# Patient Record
Sex: MALE | Race: WHITE | NOT HISPANIC OR LATINO
[De-identification: names, ages, dates, MRNs, and addresses within clinical notes are randomized per-mention and may not be internally consistent; named-entity substitution may affect disease eponyms.]

---

## 2017-01-17 ENCOUNTER — APPOINTMENT (OUTPATIENT)
Dept: COLORECTAL SURGERY | Facility: CLINIC | Age: 72
End: 2017-01-17

## 2017-01-17 VITALS
RESPIRATION RATE: 14 BRPM | SYSTOLIC BLOOD PRESSURE: 134 MMHG | HEIGHT: 71 IN | TEMPERATURE: 97.9 F | WEIGHT: 170 LBS | BODY MASS INDEX: 23.8 KG/M2 | HEART RATE: 81 BPM | DIASTOLIC BLOOD PRESSURE: 98 MMHG

## 2017-01-17 DIAGNOSIS — Z87.898 PERSONAL HISTORY OF OTHER SPECIFIED CONDITIONS: ICD-10-CM

## 2017-01-17 DIAGNOSIS — Z86.19 PERSONAL HISTORY OF OTHER INFECTIOUS AND PARASITIC DISEASES: ICD-10-CM

## 2017-01-17 DIAGNOSIS — F15.90 OTHER STIMULANT USE, UNSPECIFIED, UNCOMPLICATED: ICD-10-CM

## 2017-01-17 DIAGNOSIS — K62.89 OTHER SPECIFIED DISEASES OF ANUS AND RECTUM: ICD-10-CM

## 2017-01-17 DIAGNOSIS — Z86.39 PERSONAL HISTORY OF OTHER ENDOCRINE, NUTRITIONAL AND METABOLIC DISEASE: ICD-10-CM

## 2017-01-17 DIAGNOSIS — Z87.891 PERSONAL HISTORY OF NICOTINE DEPENDENCE: ICD-10-CM

## 2017-01-17 DIAGNOSIS — K62.5 HEMORRHAGE OF ANUS AND RECTUM: ICD-10-CM

## 2017-01-17 RX ORDER — FLUOCINOLONE ACETONIDE 0.25 MG/G
0.03 CREAM TOPICAL TWICE DAILY
Qty: 30 | Refills: 3 | Status: ACTIVE | COMMUNITY
Start: 2017-01-17 | End: 1900-01-01

## 2017-02-28 ENCOUNTER — APPOINTMENT (OUTPATIENT)
Dept: COLORECTAL SURGERY | Facility: CLINIC | Age: 72
End: 2017-02-28

## 2017-02-28 VITALS
RESPIRATION RATE: 14 BRPM | HEIGHT: 71 IN | HEART RATE: 78 BPM | BODY MASS INDEX: 23.8 KG/M2 | TEMPERATURE: 97.8 F | DIASTOLIC BLOOD PRESSURE: 88 MMHG | SYSTOLIC BLOOD PRESSURE: 130 MMHG | WEIGHT: 170 LBS

## 2017-02-28 DIAGNOSIS — K64.8 RESIDUAL HEMORRHOIDAL SKIN TAGS: ICD-10-CM

## 2017-02-28 DIAGNOSIS — Z87.19 PERSONAL HISTORY OF OTHER DISEASES OF THE DIGESTIVE SYSTEM: ICD-10-CM

## 2017-02-28 DIAGNOSIS — K60.2 ANAL FISSURE, UNSPECIFIED: ICD-10-CM

## 2017-02-28 DIAGNOSIS — Z78.9 OTHER SPECIFIED HEALTH STATUS: ICD-10-CM

## 2017-02-28 DIAGNOSIS — Z86.010 PERSONAL HISTORY OF COLONIC POLYPS: ICD-10-CM

## 2017-02-28 DIAGNOSIS — K64.4 RESIDUAL HEMORRHOIDAL SKIN TAGS: ICD-10-CM

## 2017-02-28 DIAGNOSIS — K80.20 CALCULUS OF GALLBLADDER W/OUT CHOLECYSTITIS W/OUT OBSTRUCTION: ICD-10-CM

## 2017-03-04 PROBLEM — Z87.19 HISTORY OF ACUTE PANCREATITIS: Status: RESOLVED | Noted: 2017-01-18 | Resolved: 2017-03-04

## 2017-03-04 PROBLEM — K60.2 ANAL FISSURE: Status: ACTIVE | Noted: 2017-01-17

## 2017-03-04 PROBLEM — Z86.010 HISTORY OF COLON POLYPS: Status: RESOLVED | Noted: 2017-01-18 | Resolved: 2017-03-04

## 2017-03-04 PROBLEM — Z78.9 SOCIAL ALCOHOL USE: Status: ACTIVE | Noted: 2017-01-18

## 2017-03-04 PROBLEM — K64.4 INTERNAL AND EXTERNAL BLEEDING HEMORRHOIDS: Status: ACTIVE | Noted: 2017-01-17

## 2017-03-04 PROBLEM — K80.20 GALL STONES: Status: RESOLVED | Noted: 2017-01-18 | Resolved: 2017-03-04

## 2017-03-04 RX ORDER — TOPIRAMATE 25 MG/1
25 TABLET, FILM COATED ORAL
Qty: 120 | Refills: 0 | Status: ACTIVE | COMMUNITY
Start: 2016-12-17

## 2017-03-04 RX ORDER — ATORVASTATIN CALCIUM 10 MG/1
10 TABLET, FILM COATED ORAL
Qty: 90 | Refills: 0 | Status: ACTIVE | COMMUNITY
Start: 2016-09-10

## 2017-03-04 RX ORDER — TAMSULOSIN HYDROCHLORIDE 0.4 MG/1
0.4 CAPSULE ORAL
Qty: 180 | Refills: 0 | Status: ACTIVE | COMMUNITY
Start: 2016-11-12

## 2017-03-04 RX ORDER — FLUOCINOLONE ACETONIDE 0.1 MG/ML
0.01 SOLUTION TOPICAL
Qty: 60 | Refills: 0 | Status: ACTIVE | COMMUNITY
Start: 2017-02-01

## 2017-03-04 RX ORDER — ATORVASTATIN CALCIUM 80 MG/1
TABLET, FILM COATED ORAL
Refills: 0 | Status: ACTIVE | COMMUNITY

## 2017-03-04 RX ORDER — MULTIVITAMIN
TABLET ORAL
Refills: 0 | Status: ACTIVE | COMMUNITY

## 2017-03-04 RX ORDER — FLUOCINONIDE 0.05 MG/G
0.05 OINTMENT TOPICAL
Qty: 30 | Refills: 0 | Status: ACTIVE | COMMUNITY
Start: 2017-02-01

## 2017-03-04 RX ORDER — FINASTERIDE 5 MG/1
5 TABLET, FILM COATED ORAL
Refills: 0 | Status: ACTIVE | COMMUNITY

## 2017-03-04 RX ORDER — VERAPAMIL HYDROCHLORIDE 80 MG/1
80 TABLET ORAL
Qty: 90 | Refills: 0 | Status: ACTIVE | COMMUNITY
Start: 2017-01-14

## 2017-03-04 RX ORDER — BETAMETHASONE VALERATE 1 MG/G
0.1 CREAM TOPICAL
Qty: 15 | Refills: 0 | Status: ACTIVE | COMMUNITY
Start: 2016-08-31

## 2017-03-04 RX ORDER — UBIDECARENONE/VIT E ACET 100MG-5
CAPSULE ORAL
Refills: 0 | Status: ACTIVE | COMMUNITY

## 2017-03-04 RX ORDER — ASPIRIN 325 MG/1
TABLET, FILM COATED ORAL
Refills: 0 | Status: ACTIVE | COMMUNITY

## 2018-02-02 ENCOUNTER — RESULT REVIEW (OUTPATIENT)
Age: 73
End: 2018-02-02

## 2018-11-09 ENCOUNTER — RESULT REVIEW (OUTPATIENT)
Age: 73
End: 2018-11-09

## 2019-09-16 ENCOUNTER — RX RENEWAL (OUTPATIENT)
Age: 74
End: 2019-09-16

## 2020-07-14 ENCOUNTER — APPOINTMENT (OUTPATIENT)
Dept: COLORECTAL SURGERY | Facility: CLINIC | Age: 75
End: 2020-07-14
Payer: MEDICARE

## 2020-07-14 VITALS
BODY MASS INDEX: 24.36 KG/M2 | DIASTOLIC BLOOD PRESSURE: 78 MMHG | HEIGHT: 71 IN | HEART RATE: 74 BPM | WEIGHT: 174 LBS | RESPIRATION RATE: 14 BRPM | TEMPERATURE: 97.6 F | SYSTOLIC BLOOD PRESSURE: 124 MMHG

## 2020-07-14 DIAGNOSIS — M54.30 SCIATICA, UNSPECIFIED SIDE: ICD-10-CM

## 2020-07-14 DIAGNOSIS — Z87.19 PERSONAL HISTORY OF OTHER DISEASES OF THE DIGESTIVE SYSTEM: ICD-10-CM

## 2020-07-14 PROCEDURE — 46600 DIAGNOSTIC ANOSCOPY SPX: CPT

## 2020-07-14 PROCEDURE — 99203 OFFICE O/P NEW LOW 30 MIN: CPT

## 2020-07-14 NOTE — HISTORY OF PRESENT ILLNESS
[FreeTextEntry1] : 74-year-old male who presents with a 6-week history of itching.  He does have a history of hemorrhoids, anal fissure and itching which are managed nonoperatively.  He rarely has blood with wiping.  He denies any changes in his bowel movements.  His colonoscopy is up-to-date.  No abdominal pain, nausea or vomiting.  He has a remote history of hemorrhoidectomy.

## 2020-07-14 NOTE — PHYSICAL EXAM
[Lax] : was lax [None] : there was no rectal mass  [Respiratory Effort] : normal respiratory effort [Normal Rate and Rhythm] : normal rate and rhythm [Calm] : calm [Excoriation] : no perianal excoriation [Gross Blood] : no gross blood [de-identified] : Soft, nontender, nondistended.  No mass or hernia appreciated.  Right midline paramedian incision. [de-identified] : Grade 1 internal hemorrhoids [de-identified] : Well-appearing, in no distress [de-identified] : Normocephalic, atraumatic [de-identified] : Moves extremities without difficulty [de-identified] : Warm and dry [de-identified] : Alert and oriented x3

## 2021-08-12 ENCOUNTER — APPOINTMENT (OUTPATIENT)
Dept: COLORECTAL SURGERY | Facility: CLINIC | Age: 76
End: 2021-08-12
Payer: MEDICARE

## 2021-08-12 VITALS
TEMPERATURE: 97.2 F | HEIGHT: 71 IN | SYSTOLIC BLOOD PRESSURE: 142 MMHG | DIASTOLIC BLOOD PRESSURE: 77 MMHG | BODY MASS INDEX: 25.62 KG/M2 | RESPIRATION RATE: 14 BRPM | OXYGEN SATURATION: 96 % | HEART RATE: 80 BPM | WEIGHT: 183 LBS

## 2021-08-12 DIAGNOSIS — L29.0 PRURITUS ANI: ICD-10-CM

## 2021-08-12 PROCEDURE — 46600 DIAGNOSTIC ANOSCOPY SPX: CPT

## 2021-08-12 PROCEDURE — 99213 OFFICE O/P EST LOW 20 MIN: CPT | Mod: 25

## 2021-08-12 RX ORDER — HYDROCORTISONE ACETATE 25 MG/1
25 SUPPOSITORY RECTAL
Qty: 7 | Refills: 1 | Status: ACTIVE | COMMUNITY
Start: 2020-07-14 | End: 1900-01-01

## 2021-08-12 RX ORDER — HYDROCORTISONE ACETATE AND PRAMOXINE HYDROCHLORIDE 25; 10 MG/G; MG/G
2.5-1 CREAM TOPICAL
Qty: 1 | Refills: 0 | Status: ACTIVE | COMMUNITY
Start: 2021-08-12 | End: 1900-01-01

## 2021-08-12 NOTE — HISTORY OF PRESENT ILLNESS
[FreeTextEntry1] : 75-year-old male who presents with a 3 week history of itching.  He does have a history of hemorrhoids, anal fissure and itching which are managed nonoperatively.  He had similar symptoms of anal itching last year which resolved with use of a suppository.  He is not having any abdominal pain or changes in bowel function.  No blood in his stools.  He is due for colonoscopy this year.  He has a remote history of hemorrhoidectomy.

## 2021-08-12 NOTE — PHYSICAL EXAM
[Excoriation] : no perianal excoriation [Lax] : was lax [None] : there was no rectal mass  [Gross Blood] : no gross blood [Respiratory Effort] : normal respiratory effort [Calm] : calm [de-identified] : Grade 1 internal hemorrhoids [de-identified] : Well-appearing, in no distress [de-identified] : Normocephalic, atraumatic [de-identified] : Moves extremities without difficulty [de-identified] : Warm and dry [de-identified] : Alert and oriented x3

## 2021-10-28 ENCOUNTER — APPOINTMENT (OUTPATIENT)
Dept: GASTROENTEROLOGY | Facility: CLINIC | Age: 76
End: 2021-10-28
Payer: MEDICARE

## 2021-10-28 VITALS
BODY MASS INDEX: 25.76 KG/M2 | HEART RATE: 80 BPM | DIASTOLIC BLOOD PRESSURE: 80 MMHG | WEIGHT: 184 LBS | SYSTOLIC BLOOD PRESSURE: 140 MMHG | HEIGHT: 71 IN

## 2021-10-28 DIAGNOSIS — Z86.010 PERSONAL HISTORY OF COLONIC POLYPS: ICD-10-CM

## 2021-10-28 PROCEDURE — 99213 OFFICE O/P EST LOW 20 MIN: CPT

## 2021-10-28 PROCEDURE — 99203 OFFICE O/P NEW LOW 30 MIN: CPT

## 2021-10-28 RX ORDER — SODIUM PICOSULFATE, MAGNESIUM OXIDE, AND ANHYDROUS CITRIC ACID 10; 3.5; 12 MG/160ML; G/160ML; G/160ML
10-3.5-12 MG-GM LIQUID ORAL
Qty: 2 | Refills: 0 | Status: ACTIVE | COMMUNITY
Start: 2021-10-28 | End: 1900-01-01

## 2021-10-28 NOTE — ASSESSMENT
[FreeTextEntry1] : 74yo male with gerd, hx colon polyps\par \par Will check egd r/o peterson's surveillance\par will check colonoscopy with clenpiq\par \par Risks and benefits of procedure(s) discussed with patient in detail, including but not limited to, perforation, bleeding, reaction to anesthesia, missed lesions.\par

## 2022-01-07 ENCOUNTER — APPOINTMENT (OUTPATIENT)
Dept: DISASTER EMERGENCY | Facility: CLINIC | Age: 77
End: 2022-01-07

## 2022-01-11 ENCOUNTER — APPOINTMENT (OUTPATIENT)
Dept: GASTROENTEROLOGY | Facility: AMBULATORY MEDICAL SERVICES | Age: 77
End: 2022-01-11
Payer: MEDICARE

## 2022-01-11 ENCOUNTER — RESULT REVIEW (OUTPATIENT)
Age: 77
End: 2022-01-11

## 2022-01-11 PROCEDURE — 45378 DIAGNOSTIC COLONOSCOPY: CPT

## 2022-01-11 PROCEDURE — 43239 EGD BIOPSY SINGLE/MULTIPLE: CPT

## 2022-02-03 ENCOUNTER — APPOINTMENT (OUTPATIENT)
Dept: GASTROENTEROLOGY | Facility: CLINIC | Age: 77
End: 2022-02-03
Payer: MEDICARE

## 2022-02-03 VITALS
SYSTOLIC BLOOD PRESSURE: 133 MMHG | HEART RATE: 63 BPM | WEIGHT: 186 LBS | HEIGHT: 71 IN | BODY MASS INDEX: 26.04 KG/M2 | DIASTOLIC BLOOD PRESSURE: 70 MMHG

## 2022-02-03 DIAGNOSIS — R68.81 EARLY SATIETY: ICD-10-CM

## 2022-02-03 DIAGNOSIS — K21.9 GASTRO-ESOPHAGEAL REFLUX DISEASE W/OUT ESOPHAGITIS: ICD-10-CM

## 2022-02-03 PROCEDURE — 99212 OFFICE O/P EST SF 10 MIN: CPT

## 2022-02-03 NOTE — HISTORY OF PRESENT ILLNESS
[de-identified] : 75yo male with gerd, early satiety\par \par overall he is feeling well\par He has some early satiety but still eating well without weight loss\par no significant \par Biopsies and egd reviewed in detail\par

## 2022-02-03 NOTE — ASSESSMENT
[FreeTextEntry1] : 75yo male with gerd, early satiety\par \par GERD  - continue PPI\par \par early satiety - we discussed possible further evaluation with gastric emptying study or CT scan\par as he maintaining his weight and still eating well (just not as much as in the past) we agreed to monitor for now\par \par he will call to notify me of any changes

## 2022-11-15 ENCOUNTER — OFFICE (OUTPATIENT)
Dept: URBAN - METROPOLITAN AREA CLINIC 112 | Facility: CLINIC | Age: 77
Setting detail: OPHTHALMOLOGY
End: 2022-11-15
Payer: MEDICARE

## 2022-11-15 DIAGNOSIS — H04.121: ICD-10-CM

## 2022-11-15 DIAGNOSIS — H04.123: ICD-10-CM

## 2022-11-15 DIAGNOSIS — H04.122: ICD-10-CM

## 2022-11-15 DIAGNOSIS — H40.013: ICD-10-CM

## 2022-11-15 DIAGNOSIS — H26.492: ICD-10-CM

## 2022-11-15 PROCEDURE — 92133 CPTRZD OPH DX IMG PST SGM ON: CPT | Performed by: OPHTHALMOLOGY

## 2022-11-15 PROCEDURE — 92083 EXTENDED VISUAL FIELD XM: CPT | Performed by: OPHTHALMOLOGY

## 2022-11-15 PROCEDURE — 83861 MICROFLUID ANALY TEARS: CPT | Performed by: OPHTHALMOLOGY

## 2022-11-15 PROCEDURE — 99214 OFFICE O/P EST MOD 30 MIN: CPT | Performed by: OPHTHALMOLOGY

## 2022-11-15 ASSESSMENT — KERATOMETRY
OD_K1POWER_DIOPTERS: 42.50
METHOD_AUTO_MANUAL: AUTO
OS_K2POWER_DIOPTERS: 43.25
OS_K1POWER_DIOPTERS: 42.50
OD_K2POWER_DIOPTERS: 43.00
OS_AXISANGLE_DEGREES: 127
OD_AXISANGLE_DEGREES: 076

## 2022-11-15 ASSESSMENT — CORNEAL SURGICAL SCARRING
OD_SCARRING: STROMAL
OS_SCARRING: STROMAL

## 2022-11-15 ASSESSMENT — SPHEQUIV_DERIVED
OS_SPHEQUIV: -0.375
OD_SPHEQUIV: 0

## 2022-11-15 ASSESSMENT — CONFRONTATIONAL VISUAL FIELD TEST (CVF)
OS_FINDINGS: FULL
OD_FINDINGS: FULL

## 2022-11-15 ASSESSMENT — REFRACTION_AUTOREFRACTION
OD_SPHERE: +0.25
OS_CYLINDER: -1.25
OS_AXIS: 055
OD_CYLINDER: -0.50
OD_AXIS: 126
OS_SPHERE: +0.25

## 2022-11-15 ASSESSMENT — PACHYMETRY
OS_CT_UM: 588
OD_CT_UM: 579
OS_CT_CORRECTION: -3
OD_CT_CORRECTION: -2

## 2022-11-15 ASSESSMENT — TONOMETRY
OS_IOP_MMHG: 11
OD_IOP_MMHG: 10

## 2022-11-15 ASSESSMENT — AXIALLENGTH_DERIVED
OS_AL: 23.9737
OD_AL: 23.8706

## 2022-11-15 ASSESSMENT — VISUAL ACUITY
OS_BCVA: 20/20-1
OD_BCVA: 20/25

## 2022-11-17 ENCOUNTER — ASC (OUTPATIENT)
Dept: URBAN - METROPOLITAN AREA SURGERY 8 | Facility: SURGERY | Age: 77
Setting detail: OPHTHALMOLOGY
End: 2022-11-17
Payer: MEDICARE

## 2022-11-17 DIAGNOSIS — H26.492: ICD-10-CM

## 2022-11-17 PROCEDURE — 66821 AFTER CATARACT LASER SURGERY: CPT | Performed by: OPHTHALMOLOGY

## 2022-11-17 ASSESSMENT — REFRACTION_AUTOREFRACTION
OS_AXIS: 055
OS_CYLINDER: -1.25
OD_SPHERE: +0.25
OD_AXIS: 126
OD_CYLINDER: -0.50
OS_SPHERE: +0.25

## 2022-11-17 ASSESSMENT — KERATOMETRY
OD_AXISANGLE_DEGREES: 076
OS_K1POWER_DIOPTERS: 42.50
OS_K2POWER_DIOPTERS: 43.25
OS_AXISANGLE_DEGREES: 127
METHOD_AUTO_MANUAL: AUTO
OD_K1POWER_DIOPTERS: 42.50
OD_K2POWER_DIOPTERS: 43.00

## 2022-11-17 ASSESSMENT — VISUAL ACUITY
OD_BCVA: 20/25
OS_BCVA: 20/20-1

## 2022-11-17 ASSESSMENT — AXIALLENGTH_DERIVED
OS_AL: 23.9737
OD_AL: 23.8706

## 2022-11-17 ASSESSMENT — SPHEQUIV_DERIVED
OD_SPHEQUIV: 0
OS_SPHEQUIV: -0.375

## 2022-12-01 ENCOUNTER — RX ONLY (RX ONLY)
Age: 77
End: 2022-12-01

## 2022-12-01 ENCOUNTER — OFFICE (OUTPATIENT)
Dept: URBAN - METROPOLITAN AREA CLINIC 112 | Facility: CLINIC | Age: 77
Setting detail: OPHTHALMOLOGY
End: 2022-12-01
Payer: MEDICARE

## 2022-12-01 DIAGNOSIS — H26.492: ICD-10-CM

## 2022-12-01 PROBLEM — H26.493 POSTERIOR CAPSULAR OPACIFICATION; BOTH EYES: Status: ACTIVE | Noted: 2022-12-01

## 2022-12-01 PROCEDURE — 99024 POSTOP FOLLOW-UP VISIT: CPT | Performed by: PHYSICIAN ASSISTANT

## 2022-12-01 ASSESSMENT — PACHYMETRY
OD_CT_CORRECTION: -2
OS_CT_CORRECTION: -3
OD_CT_UM: 579
OS_CT_UM: 588

## 2022-12-01 ASSESSMENT — KERATOMETRY
OD_AXISANGLE_DEGREES: 045
OS_K2POWER_DIOPTERS: 43.00
OD_K2POWER_DIOPTERS: 43.00
OS_K1POWER_DIOPTERS: 42.25
METHOD_AUTO_MANUAL: AUTO
OD_K1POWER_DIOPTERS: 42.75
OS_AXISANGLE_DEGREES: 161

## 2022-12-01 ASSESSMENT — VISUAL ACUITY
OD_BCVA: 20/25
OS_BCVA: 20/20

## 2022-12-01 ASSESSMENT — TONOMETRY
OD_IOP_MMHG: 08
OS_IOP_MMHG: 12

## 2022-12-01 ASSESSMENT — AXIALLENGTH_DERIVED
OD_AL: 23.8237
OS_AL: 24.0183

## 2022-12-01 ASSESSMENT — REFRACTION_AUTOREFRACTION
OS_CYLINDER: -1.00
OS_SPHERE: +0.25
OD_SPHERE: +0.50
OD_CYLINDER: -1.00
OD_AXIS: 129
OS_AXIS: 064

## 2022-12-01 ASSESSMENT — CORNEAL SURGICAL SCARRING
OD_SCARRING: STROMAL
OS_SCARRING: STROMAL

## 2022-12-01 ASSESSMENT — SPHEQUIV_DERIVED
OS_SPHEQUIV: -0.25
OD_SPHEQUIV: 0

## 2022-12-01 ASSESSMENT — SUPERFICIAL PUNCTATE KERATITIS (SPK)
OD_SPK: 1+
OS_SPK: 1+

## 2022-12-01 ASSESSMENT — CONFRONTATIONAL VISUAL FIELD TEST (CVF)
OD_FINDINGS: FULL
OS_FINDINGS: FULL

## 2023-03-07 ENCOUNTER — OFFICE (OUTPATIENT)
Dept: URBAN - METROPOLITAN AREA CLINIC 112 | Facility: CLINIC | Age: 78
Setting detail: OPHTHALMOLOGY
End: 2023-03-07
Payer: MEDICARE

## 2023-03-07 DIAGNOSIS — H43.393: ICD-10-CM

## 2023-03-07 DIAGNOSIS — H16.222: ICD-10-CM

## 2023-03-07 DIAGNOSIS — H40.013: ICD-10-CM

## 2023-03-07 DIAGNOSIS — H35.013: ICD-10-CM

## 2023-03-07 DIAGNOSIS — H16.221: ICD-10-CM

## 2023-03-07 DIAGNOSIS — H16.223: ICD-10-CM

## 2023-03-07 PROCEDURE — 99213 OFFICE O/P EST LOW 20 MIN: CPT | Performed by: OPHTHALMOLOGY

## 2023-03-07 PROCEDURE — 83861 MICROFLUID ANALY TEARS: CPT | Performed by: OPHTHALMOLOGY

## 2023-03-07 ASSESSMENT — SPHEQUIV_DERIVED
OS_SPHEQUIV: -0.375
OD_SPHEQUIV: -0.125

## 2023-03-07 ASSESSMENT — REFRACTION_AUTOREFRACTION
OS_CYLINDER: -1.25
OD_AXIS: 116
OS_SPHERE: +0.25
OD_SPHERE: +0.25
OD_CYLINDER: -0.75
OS_AXIS: 062

## 2023-03-07 ASSESSMENT — KERATOMETRY
OS_AXISANGLE_DEGREES: 140
OS_K1POWER_DIOPTERS: 42.00
OD_K1POWER_DIOPTERS: 42.25
OS_K2POWER_DIOPTERS: 43.00
METHOD_AUTO_MANUAL: AUTO
OD_K2POWER_DIOPTERS: 43.00
OD_AXISANGLE_DEGREES: 058

## 2023-03-07 ASSESSMENT — SUPERFICIAL PUNCTATE KERATITIS (SPK)
OS_SPK: 1+
OD_SPK: 1+

## 2023-03-07 ASSESSMENT — CORNEAL SURGICAL SCARRING
OD_SCARRING: STROMAL
OS_SCARRING: STROMAL

## 2023-03-07 ASSESSMENT — PACHYMETRY
OS_CT_CORRECTION: -3
OS_CT_UM: 588
OD_CT_CORRECTION: -2
OD_CT_UM: 579

## 2023-03-07 ASSESSMENT — TONOMETRY: OS_IOP_MMHG: 10

## 2023-03-07 ASSESSMENT — CONFRONTATIONAL VISUAL FIELD TEST (CVF)
OS_FINDINGS: FULL
OD_FINDINGS: FULL

## 2023-03-07 ASSESSMENT — VISUAL ACUITY
OS_BCVA: 20/20
OD_BCVA: 20/25-1

## 2023-03-07 ASSESSMENT — AXIALLENGTH_DERIVED
OS_AL: 24.1167
OD_AL: 23.9679

## 2023-03-30 ENCOUNTER — RX ONLY (RX ONLY)
Age: 78
End: 2023-03-30

## 2023-03-30 ENCOUNTER — OFFICE (OUTPATIENT)
Dept: URBAN - METROPOLITAN AREA CLINIC 63 | Facility: CLINIC | Age: 78
Setting detail: OPHTHALMOLOGY
End: 2023-03-30
Payer: MEDICARE

## 2023-03-30 ENCOUNTER — OFFICE (OUTPATIENT)
Dept: URBAN - METROPOLITAN AREA CLINIC 112 | Facility: CLINIC | Age: 78
Setting detail: OPHTHALMOLOGY
End: 2023-03-30
Payer: MEDICARE

## 2023-03-30 DIAGNOSIS — H33.8: ICD-10-CM

## 2023-03-30 DIAGNOSIS — H43.813: ICD-10-CM

## 2023-03-30 DIAGNOSIS — H33.42: ICD-10-CM

## 2023-03-30 DIAGNOSIS — H35.373: ICD-10-CM

## 2023-03-30 DIAGNOSIS — H43.391: ICD-10-CM

## 2023-03-30 PROCEDURE — 92134 CPTRZ OPH DX IMG PST SGM RTA: CPT | Performed by: OPHTHALMOLOGY

## 2023-03-30 PROCEDURE — 92012 INTRM OPH EXAM EST PATIENT: CPT | Performed by: PHYSICIAN ASSISTANT

## 2023-03-30 PROCEDURE — 92012 INTRM OPH EXAM EST PATIENT: CPT | Performed by: OPHTHALMOLOGY

## 2023-03-30 ASSESSMENT — SPHEQUIV_DERIVED
OD_SPHEQUIV: -0.25
OS_SPHEQUIV: -1.125
OD_SPHEQUIV: -0.25
OS_SPHEQUIV: -1.125

## 2023-03-30 ASSESSMENT — KERATOMETRY
METHOD_AUTO_MANUAL: AUTO
OD_K1POWER_DIOPTERS: 42.25
OS_K1POWER_DIOPTERS: 42.00
OD_AXISANGLE_DEGREES: 049
OD_K2POWER_DIOPTERS: 43.00
OS_K1POWER_DIOPTERS: 42.00
OD_AXISANGLE_DEGREES: 049
OS_AXISANGLE_DEGREES: 165
OS_K2POWER_DIOPTERS: 43.00
OD_K2POWER_DIOPTERS: 43.00
OS_K2POWER_DIOPTERS: 43.00
OD_K1POWER_DIOPTERS: 42.25
OS_AXISANGLE_DEGREES: 165
METHOD_AUTO_MANUAL: AUTO

## 2023-03-30 ASSESSMENT — SUPERFICIAL PUNCTATE KERATITIS (SPK)
OS_SPK: 1+
OD_SPK: 1+
OS_SPK: 1+
OD_SPK: 1+

## 2023-03-30 ASSESSMENT — AXIALLENGTH_DERIVED
OD_AL: 24.0183
OD_AL: 24.0183
OS_AL: 24.426
OS_AL: 24.426

## 2023-03-30 ASSESSMENT — REFRACTION_AUTOREFRACTION
OS_AXIS: 064
OD_CYLINDER: -0.50
OD_AXIS: 116
OS_CYLINDER: -0.75
OS_SPHERE: -0.75
OS_SPHERE: -0.75
OD_SPHERE: 0.00
OD_SPHERE: 0.00
OS_AXIS: 064
OD_CYLINDER: -0.50
OD_AXIS: 116
OS_CYLINDER: -0.75

## 2023-03-30 ASSESSMENT — REFRACTION_MANIFEST
OD_AXIS: 070
OD_CYLINDER: -0.25
OD_SPHERE: PLANO
OD_SPHERE: PLANO
OD_VA1: 20/20
OD_VA1: 20/20
OD_AXIS: 070
OD_CYLINDER: -0.25

## 2023-03-30 ASSESSMENT — PACHYMETRY
OS_CT_UM: 588
OD_CT_CORRECTION: -2
OS_CT_UM: 588
OD_CT_CORRECTION: -2
OD_CT_UM: 579
OS_CT_CORRECTION: -3
OS_CT_CORRECTION: -3
OD_CT_UM: 579

## 2023-03-30 ASSESSMENT — VISUAL ACUITY
OS_BCVA: 20/20-1
OD_BCVA: 20/40
OD_BCVA: 20/40
OS_BCVA: 20/20-1

## 2023-03-30 ASSESSMENT — CONFRONTATIONAL VISUAL FIELD TEST (CVF)
OD_FINDINGS: FULL
OS_FINDINGS: FULL

## 2023-03-30 ASSESSMENT — TONOMETRY
OS_IOP_MMHG: 13
OD_IOP_MMHG: 11
OS_IOP_MMHG: 16
OS_IOP_MMHG: 14
OD_IOP_MMHG: 15

## 2023-03-30 ASSESSMENT — CORNEAL SURGICAL SCARRING
OD_SCARRING: STROMAL
OD_SCARRING: STROMAL
OS_SCARRING: STROMAL
OS_SCARRING: STROMAL

## 2023-04-03 ENCOUNTER — ASC (OUTPATIENT)
Dept: URBAN - METROPOLITAN AREA SURGERY 8 | Facility: SURGERY | Age: 78
Setting detail: OPHTHALMOLOGY
End: 2023-04-03
Payer: MEDICARE

## 2023-04-03 DIAGNOSIS — H33.42: ICD-10-CM

## 2023-04-03 PROCEDURE — 67113 REPAIR RETINAL DETACH CPLX: CPT | Performed by: OPHTHALMOLOGY

## 2023-04-04 ENCOUNTER — RX ONLY (RX ONLY)
Age: 78
End: 2023-04-04

## 2023-04-04 ENCOUNTER — OFFICE (OUTPATIENT)
Dept: URBAN - METROPOLITAN AREA CLINIC 94 | Facility: CLINIC | Age: 78
Setting detail: OPHTHALMOLOGY
End: 2023-04-04
Payer: MEDICARE

## 2023-04-04 DIAGNOSIS — H33.42: ICD-10-CM

## 2023-04-04 PROCEDURE — 99024 POSTOP FOLLOW-UP VISIT: CPT | Performed by: OPHTHALMOLOGY

## 2023-04-04 ASSESSMENT — REFRACTION_AUTOREFRACTION
OS_SPHERE: -0.75
OD_CYLINDER: -0.50
OD_AXIS: 116
OD_SPHERE: 0.00
OS_CYLINDER: -0.75
OS_AXIS: 064

## 2023-04-04 ASSESSMENT — REFRACTION_MANIFEST
OD_VA1: 20/20
OD_SPHERE: PLANO
OD_AXIS: 070
OD_CYLINDER: -0.25

## 2023-04-04 ASSESSMENT — KERATOMETRY
OD_K1POWER_DIOPTERS: 42.25
METHOD_AUTO_MANUAL: AUTO
OS_K1POWER_DIOPTERS: 42.00
OS_AXISANGLE_DEGREES: 165
OD_K2POWER_DIOPTERS: 43.00
OS_K2POWER_DIOPTERS: 43.00
OD_AXISANGLE_DEGREES: 049

## 2023-04-04 ASSESSMENT — SUPERFICIAL PUNCTATE KERATITIS (SPK)
OS_SPK: 1+
OD_SPK: 1+

## 2023-04-04 ASSESSMENT — CONFRONTATIONAL VISUAL FIELD TEST (CVF)
OS_FINDINGS: FULL
OD_FINDINGS: FULL

## 2023-04-04 ASSESSMENT — TONOMETRY: OS_IOP_MMHG: 17

## 2023-04-04 ASSESSMENT — PACHYMETRY
OD_CT_CORRECTION: -2
OS_CT_CORRECTION: -3
OS_CT_UM: 588
OD_CT_UM: 579

## 2023-04-04 ASSESSMENT — AXIALLENGTH_DERIVED
OS_AL: 24.426
OD_AL: 24.0183

## 2023-04-04 ASSESSMENT — CORNEAL SURGICAL SCARRING
OD_SCARRING: STROMAL
OS_SCARRING: STROMAL

## 2023-04-04 ASSESSMENT — SPHEQUIV_DERIVED
OS_SPHEQUIV: -1.125
OD_SPHEQUIV: -0.25

## 2023-04-04 ASSESSMENT — VISUAL ACUITY
OD_BCVA: 20/100
OS_BCVA: 20/20

## 2023-04-11 ENCOUNTER — OFFICE (OUTPATIENT)
Dept: URBAN - METROPOLITAN AREA CLINIC 94 | Facility: CLINIC | Age: 78
Setting detail: OPHTHALMOLOGY
End: 2023-04-11
Payer: MEDICARE

## 2023-04-11 DIAGNOSIS — H35.373: ICD-10-CM

## 2023-04-11 DIAGNOSIS — H43.391: ICD-10-CM

## 2023-04-11 DIAGNOSIS — H43.813: ICD-10-CM

## 2023-04-11 DIAGNOSIS — H33.42: ICD-10-CM

## 2023-04-11 PROCEDURE — 99024 POSTOP FOLLOW-UP VISIT: CPT | Performed by: OPHTHALMOLOGY

## 2023-04-11 ASSESSMENT — KERATOMETRY
OD_AXISANGLE_DEGREES: 049
OD_K1POWER_DIOPTERS: 42.25
OD_K2POWER_DIOPTERS: 43.00
OS_AXISANGLE_DEGREES: 165
OS_K2POWER_DIOPTERS: 43.00
METHOD_AUTO_MANUAL: AUTO
OS_K1POWER_DIOPTERS: 42.00

## 2023-04-11 ASSESSMENT — PACHYMETRY
OD_CT_CORRECTION: -2
OS_CT_CORRECTION: -3
OD_CT_UM: 579
OS_CT_UM: 588

## 2023-04-11 ASSESSMENT — SUPERFICIAL PUNCTATE KERATITIS (SPK)
OD_SPK: 1+
OS_SPK: 1+

## 2023-04-11 ASSESSMENT — REFRACTION_MANIFEST
OD_AXIS: 070
OD_SPHERE: PLANO
OD_VA1: 20/20
OD_CYLINDER: -0.25

## 2023-04-11 ASSESSMENT — VISUAL ACUITY
OD_BCVA: 20/100-1
OS_BCVA: 20/20

## 2023-04-11 ASSESSMENT — CONFRONTATIONAL VISUAL FIELD TEST (CVF)
OD_FINDINGS: FULL
OS_FINDINGS: FULL

## 2023-04-11 ASSESSMENT — REFRACTION_AUTOREFRACTION
OD_AXIS: 116
OD_CYLINDER: -0.50
OS_AXIS: 064
OS_SPHERE: -0.75
OS_CYLINDER: -0.75
OD_SPHERE: 0.00

## 2023-04-11 ASSESSMENT — TONOMETRY
OD_IOP_MMHG: 10
OS_IOP_MMHG: 20

## 2023-04-11 ASSESSMENT — AXIALLENGTH_DERIVED
OS_AL: 24.426
OD_AL: 24.0183

## 2023-04-11 ASSESSMENT — SPHEQUIV_DERIVED
OD_SPHEQUIV: -0.25
OS_SPHEQUIV: -1.125

## 2023-04-11 ASSESSMENT — CORNEAL SURGICAL SCARRING
OS_SCARRING: STROMAL
OD_SCARRING: STROMAL

## 2023-05-03 ENCOUNTER — OFFICE (OUTPATIENT)
Dept: URBAN - METROPOLITAN AREA CLINIC 94 | Facility: CLINIC | Age: 78
Setting detail: OPHTHALMOLOGY
End: 2023-05-03
Payer: MEDICARE

## 2023-05-03 DIAGNOSIS — H35.373: ICD-10-CM

## 2023-05-03 DIAGNOSIS — H43.391: ICD-10-CM

## 2023-05-03 DIAGNOSIS — H43.813: ICD-10-CM

## 2023-05-03 DIAGNOSIS — H33.42: ICD-10-CM

## 2023-05-03 PROCEDURE — 99024 POSTOP FOLLOW-UP VISIT: CPT | Performed by: OPHTHALMOLOGY

## 2023-05-03 ASSESSMENT — KERATOMETRY
OD_AXISANGLE_DEGREES: 049
OD_K1POWER_DIOPTERS: 42.25
OD_K2POWER_DIOPTERS: 43.00
OS_K2POWER_DIOPTERS: 43.00
METHOD_AUTO_MANUAL: AUTO
OS_AXISANGLE_DEGREES: 165
OS_K1POWER_DIOPTERS: 42.00

## 2023-05-03 ASSESSMENT — REFRACTION_AUTOREFRACTION
OD_CYLINDER: -0.50
OS_AXIS: 064
OS_SPHERE: -0.75
OS_CYLINDER: -0.75
OD_SPHERE: 0.00
OD_AXIS: 116

## 2023-05-03 ASSESSMENT — SPHEQUIV_DERIVED
OD_SPHEQUIV: -0.25
OS_SPHEQUIV: -1.125

## 2023-05-03 ASSESSMENT — VISUAL ACUITY
OS_BCVA: 20/25
OD_BCVA: 20/80

## 2023-05-03 ASSESSMENT — PACHYMETRY
OD_CT_CORRECTION: -2
OD_CT_UM: 579
OS_CT_UM: 588
OS_CT_CORRECTION: -3

## 2023-05-03 ASSESSMENT — CORNEAL SURGICAL SCARRING
OD_SCARRING: STROMAL
OS_SCARRING: STROMAL

## 2023-05-03 ASSESSMENT — TONOMETRY
OS_IOP_MMHG: 20
OD_IOP_MMHG: 10

## 2023-05-03 ASSESSMENT — CONFRONTATIONAL VISUAL FIELD TEST (CVF)
OD_FINDINGS: FULL
OS_FINDINGS: FULL

## 2023-05-03 ASSESSMENT — AXIALLENGTH_DERIVED
OD_AL: 24.0183
OS_AL: 24.426

## 2023-05-03 ASSESSMENT — SUPERFICIAL PUNCTATE KERATITIS (SPK)
OS_SPK: 1+
OD_SPK: 1+

## 2023-07-05 ENCOUNTER — OFFICE (OUTPATIENT)
Dept: URBAN - METROPOLITAN AREA CLINIC 94 | Facility: CLINIC | Age: 78
Setting detail: OPHTHALMOLOGY
End: 2023-07-05
Payer: MEDICARE

## 2023-07-05 DIAGNOSIS — H33.42: ICD-10-CM

## 2023-07-05 PROCEDURE — 92134 CPTRZ OPH DX IMG PST SGM RTA: CPT | Performed by: OPHTHALMOLOGY

## 2023-07-05 PROCEDURE — 92012 INTRM OPH EXAM EST PATIENT: CPT | Performed by: OPHTHALMOLOGY

## 2023-07-05 ASSESSMENT — REFRACTION_AUTOREFRACTION
OD_AXIS: 116
OS_AXIS: 064
OS_SPHERE: -0.75
OD_CYLINDER: -0.50
OS_CYLINDER: -0.75
OD_SPHERE: 0.00

## 2023-07-05 ASSESSMENT — PACHYMETRY
OD_CT_CORRECTION: -2
OS_CT_UM: 588
OD_CT_UM: 579
OS_CT_CORRECTION: -3

## 2023-07-05 ASSESSMENT — AXIALLENGTH_DERIVED
OD_AL: 24.0183
OS_AL: 24.426

## 2023-07-05 ASSESSMENT — KERATOMETRY
METHOD_AUTO_MANUAL: AUTO
OS_K1POWER_DIOPTERS: 42.00
OS_AXISANGLE_DEGREES: 165
OD_K2POWER_DIOPTERS: 43.00
OD_AXISANGLE_DEGREES: 049
OD_K1POWER_DIOPTERS: 42.25
OS_K2POWER_DIOPTERS: 43.00

## 2023-07-05 ASSESSMENT — CORNEAL SURGICAL SCARRING
OS_SCARRING: STROMAL
OD_SCARRING: STROMAL

## 2023-07-05 ASSESSMENT — TONOMETRY
OS_IOP_MMHG: 17
OD_IOP_MMHG: 10

## 2023-07-05 ASSESSMENT — SUPERFICIAL PUNCTATE KERATITIS (SPK)
OS_SPK: 1+
OD_SPK: 1+

## 2023-07-05 ASSESSMENT — SPHEQUIV_DERIVED
OS_SPHEQUIV: -1.125
OD_SPHEQUIV: -0.25

## 2023-07-05 ASSESSMENT — VISUAL ACUITY
OD_BCVA: 20/60-1
OS_BCVA: 20/20-1

## 2023-07-05 ASSESSMENT — CONFRONTATIONAL VISUAL FIELD TEST (CVF)
OD_FINDINGS: FULL
OS_FINDINGS: FULL

## 2023-08-11 ENCOUNTER — ASC (OUTPATIENT)
Dept: URBAN - METROPOLITAN AREA SURGERY 8 | Facility: SURGERY | Age: 78
Setting detail: OPHTHALMOLOGY
End: 2023-08-11
Payer: MEDICARE

## 2023-08-11 DIAGNOSIS — H33.42: ICD-10-CM

## 2023-08-11 PROCEDURE — 67121 REMOVE EYE IMPLANT MATERIAL: CPT | Performed by: OPHTHALMOLOGY

## 2023-08-12 ENCOUNTER — RX ONLY (RX ONLY)
Age: 78
End: 2023-08-12

## 2023-08-12 ENCOUNTER — OFFICE (OUTPATIENT)
Dept: URBAN - METROPOLITAN AREA CLINIC 94 | Facility: CLINIC | Age: 78
Setting detail: OPHTHALMOLOGY
End: 2023-08-12
Payer: MEDICARE

## 2023-08-12 DIAGNOSIS — H33.42: ICD-10-CM

## 2023-08-12 PROCEDURE — 99024 POSTOP FOLLOW-UP VISIT: CPT | Performed by: SPECIALIST

## 2023-08-12 ASSESSMENT — VISUAL ACUITY
OS_BCVA: 20/20
OD_BCVA: 20/50-1

## 2023-08-12 ASSESSMENT — KERATOMETRY
OS_AXISANGLE_DEGREES: 165
OS_K1POWER_DIOPTERS: 42.00
OS_K2POWER_DIOPTERS: 43.00
METHOD_AUTO_MANUAL: AUTO
OD_K1POWER_DIOPTERS: 42.25
OD_AXISANGLE_DEGREES: 049
OD_K2POWER_DIOPTERS: 43.00

## 2023-08-12 ASSESSMENT — SPHEQUIV_DERIVED
OD_SPHEQUIV: -0.25
OS_SPHEQUIV: -1.125

## 2023-08-12 ASSESSMENT — CORNEAL SURGICAL SCARRING
OS_SCARRING: STROMAL
OD_SCARRING: STROMAL

## 2023-08-12 ASSESSMENT — REFRACTION_AUTOREFRACTION
OD_AXIS: 116
OS_SPHERE: -0.75
OS_CYLINDER: -0.75
OS_AXIS: 064
OD_CYLINDER: -0.50
OD_SPHERE: 0.00

## 2023-08-12 ASSESSMENT — PACHYMETRY
OD_CT_UM: 579
OS_CT_CORRECTION: -3
OS_CT_UM: 588
OD_CT_CORRECTION: -2

## 2023-08-12 ASSESSMENT — SUPERFICIAL PUNCTATE KERATITIS (SPK)
OS_SPK: 1+
OD_SPK: 1+

## 2023-08-12 ASSESSMENT — AXIALLENGTH_DERIVED
OS_AL: 24.426
OD_AL: 24.0183

## 2023-08-12 ASSESSMENT — TONOMETRY
OS_IOP_MMHG: 16
OD_IOP_MMHG: 11

## 2023-08-12 ASSESSMENT — CONFRONTATIONAL VISUAL FIELD TEST (CVF)
OD_FINDINGS: FULL
OS_FINDINGS: FULL

## 2023-08-15 ENCOUNTER — OFFICE (OUTPATIENT)
Dept: URBAN - METROPOLITAN AREA CLINIC 94 | Facility: CLINIC | Age: 78
Setting detail: OPHTHALMOLOGY
End: 2023-08-15
Payer: MEDICARE

## 2023-08-15 DIAGNOSIS — H33.42: ICD-10-CM

## 2023-08-15 PROCEDURE — 99024 POSTOP FOLLOW-UP VISIT: CPT | Performed by: OPHTHALMOLOGY

## 2023-08-15 ASSESSMENT — REFRACTION_AUTOREFRACTION
OS_AXIS: 064
OD_CYLINDER: -0.50
OS_CYLINDER: -0.75
OD_SPHERE: 0.00
OS_SPHERE: -0.75
OD_AXIS: 116

## 2023-08-15 ASSESSMENT — SPHEQUIV_DERIVED
OD_SPHEQUIV: -0.25
OS_SPHEQUIV: -1.125

## 2023-08-15 ASSESSMENT — KERATOMETRY
OD_K1POWER_DIOPTERS: 42.25
METHOD_AUTO_MANUAL: AUTO
OD_AXISANGLE_DEGREES: 049
OS_K1POWER_DIOPTERS: 42.00
OS_AXISANGLE_DEGREES: 165
OS_K2POWER_DIOPTERS: 43.00
OD_K2POWER_DIOPTERS: 43.00

## 2023-08-15 ASSESSMENT — PACHYMETRY
OS_CT_UM: 588
OD_CT_CORRECTION: -2
OD_CT_UM: 579
OS_CT_CORRECTION: -3

## 2023-08-15 ASSESSMENT — VISUAL ACUITY
OS_BCVA: 20/20
OD_BCVA: 20/50-1

## 2023-08-15 ASSESSMENT — AXIALLENGTH_DERIVED
OD_AL: 24.0183
OS_AL: 24.426

## 2023-08-15 ASSESSMENT — TONOMETRY
OS_IOP_MMHG: 16
OD_IOP_MMHG: 10

## 2023-08-15 ASSESSMENT — CONFRONTATIONAL VISUAL FIELD TEST (CVF)
OD_FINDINGS: FULL
OS_FINDINGS: FULL

## 2023-08-15 ASSESSMENT — CORNEAL SURGICAL SCARRING
OS_SCARRING: STROMAL
OD_SCARRING: STROMAL

## 2023-08-15 ASSESSMENT — SUPERFICIAL PUNCTATE KERATITIS (SPK)
OS_SPK: 1+
OD_SPK: 1+

## 2023-09-05 ENCOUNTER — OFFICE (OUTPATIENT)
Dept: URBAN - METROPOLITAN AREA CLINIC 112 | Facility: CLINIC | Age: 78
Setting detail: OPHTHALMOLOGY
End: 2023-09-05
Payer: MEDICARE

## 2023-09-05 ENCOUNTER — RX ONLY (RX ONLY)
Age: 78
End: 2023-09-05

## 2023-09-05 DIAGNOSIS — H40.013: ICD-10-CM

## 2023-09-05 DIAGNOSIS — H16.223: ICD-10-CM

## 2023-09-05 PROCEDURE — 92014 COMPRE OPH EXAM EST PT 1/>: CPT | Performed by: OPHTHALMOLOGY

## 2023-09-05 PROCEDURE — 92083 EXTENDED VISUAL FIELD XM: CPT | Performed by: OPHTHALMOLOGY

## 2023-09-05 PROCEDURE — 92133 CPTRZD OPH DX IMG PST SGM ON: CPT | Performed by: OPHTHALMOLOGY

## 2023-09-05 ASSESSMENT — KERATOMETRY
METHOD_AUTO_MANUAL: AUTO
OS_AXISANGLE_DEGREES: 155
OD_AXISANGLE_DEGREES: 055
OD_K1POWER_DIOPTERS: 42.25
OD_K2POWER_DIOPTERS: 43.00
OS_K1POWER_DIOPTERS: 42.00
OS_K2POWER_DIOPTERS: 43.00

## 2023-09-05 ASSESSMENT — REFRACTION_AUTOREFRACTION
OD_AXIS: 117
OS_AXIS: 063
OS_SPHERE: +0.25
OS_CYLINDER: -0.75
OD_CYLINDER: -1.00
OD_SPHERE: +0.75

## 2023-09-05 ASSESSMENT — AXIALLENGTH_DERIVED
OS_AL: 24.0154
OD_AL: 23.818

## 2023-09-05 ASSESSMENT — TONOMETRY: OS_IOP_MMHG: 11

## 2023-09-05 ASSESSMENT — VISUAL ACUITY
OS_BCVA: 20/25
OD_BCVA: 20/50-1

## 2023-09-05 ASSESSMENT — CORNEAL SURGICAL SCARRING
OS_SCARRING: STROMAL
OD_SCARRING: STROMAL

## 2023-09-05 ASSESSMENT — PACHYMETRY
OS_CT_UM: 588
OD_CT_UM: 579
OD_CT_CORRECTION: -2
OS_CT_CORRECTION: -3

## 2023-09-05 ASSESSMENT — SPHEQUIV_DERIVED
OD_SPHEQUIV: 0.25
OS_SPHEQUIV: -0.125

## 2023-09-05 ASSESSMENT — CONFRONTATIONAL VISUAL FIELD TEST (CVF)
OS_FINDINGS: FULL
OD_FINDINGS: FULL

## 2023-09-05 ASSESSMENT — SUPERFICIAL PUNCTATE KERATITIS (SPK)
OD_SPK: 1+
OS_SPK: 1+

## 2023-09-06 ENCOUNTER — OFFICE (OUTPATIENT)
Dept: URBAN - METROPOLITAN AREA CLINIC 94 | Facility: CLINIC | Age: 78
Setting detail: OPHTHALMOLOGY
End: 2023-09-06
Payer: MEDICARE

## 2023-09-06 DIAGNOSIS — H35.373: ICD-10-CM

## 2023-09-06 DIAGNOSIS — H43.391: ICD-10-CM

## 2023-09-06 DIAGNOSIS — H43.813: ICD-10-CM

## 2023-09-06 DIAGNOSIS — H33.42: ICD-10-CM

## 2023-09-06 PROCEDURE — 99024 POSTOP FOLLOW-UP VISIT: CPT | Performed by: OPHTHALMOLOGY

## 2023-09-06 PROCEDURE — 92134 CPTRZ OPH DX IMG PST SGM RTA: CPT | Performed by: OPHTHALMOLOGY

## 2023-09-06 ASSESSMENT — PACHYMETRY
OD_CT_CORRECTION: -2
OD_CT_UM: 579
OS_CT_UM: 588
OS_CT_CORRECTION: -3

## 2023-09-06 ASSESSMENT — REFRACTION_AUTOREFRACTION
OS_SPHERE: +0.25
OS_AXIS: 063
OD_SPHERE: +0.75
OS_CYLINDER: -0.75
OD_AXIS: 117
OD_CYLINDER: -1.00

## 2023-09-06 ASSESSMENT — KERATOMETRY
OS_K1POWER_DIOPTERS: 42.00
OD_K2POWER_DIOPTERS: 43.00
OS_K2POWER_DIOPTERS: 43.00
OD_AXISANGLE_DEGREES: 055
OD_K1POWER_DIOPTERS: 42.25
METHOD_AUTO_MANUAL: AUTO
OS_AXISANGLE_DEGREES: 155

## 2023-09-06 ASSESSMENT — SPHEQUIV_DERIVED
OD_SPHEQUIV: 0.25
OS_SPHEQUIV: -0.125

## 2023-09-06 ASSESSMENT — AXIALLENGTH_DERIVED
OS_AL: 24.0154
OD_AL: 23.818

## 2023-09-06 ASSESSMENT — CORNEAL SURGICAL SCARRING
OD_SCARRING: STROMAL
OS_SCARRING: STROMAL

## 2023-09-06 ASSESSMENT — TONOMETRY
OS_IOP_MMHG: 15
OD_IOP_MMHG: 10

## 2023-09-06 ASSESSMENT — VISUAL ACUITY
OD_BCVA: 20/40
OS_BCVA: 20/20

## 2023-09-06 ASSESSMENT — SUPERFICIAL PUNCTATE KERATITIS (SPK)
OS_SPK: 1+
OD_SPK: 1+

## 2023-12-13 ENCOUNTER — OFFICE (OUTPATIENT)
Dept: URBAN - METROPOLITAN AREA CLINIC 94 | Facility: CLINIC | Age: 78
Setting detail: OPHTHALMOLOGY
End: 2023-12-13
Payer: MEDICARE

## 2023-12-13 DIAGNOSIS — H16.223: ICD-10-CM

## 2023-12-13 DIAGNOSIS — H43.391: ICD-10-CM

## 2023-12-13 DIAGNOSIS — H40.013: ICD-10-CM

## 2023-12-13 PROCEDURE — 99213 OFFICE O/P EST LOW 20 MIN: CPT | Performed by: OPHTHALMOLOGY

## 2023-12-13 ASSESSMENT — SUPERFICIAL PUNCTATE KERATITIS (SPK)
OD_SPK: 1+
OS_SPK: 1+

## 2023-12-13 ASSESSMENT — CONFRONTATIONAL VISUAL FIELD TEST (CVF)
OS_FINDINGS: FULL
OD_FINDINGS: FULL

## 2023-12-13 ASSESSMENT — CORNEAL SURGICAL SCARRING
OD_SCARRING: STROMAL
OS_SCARRING: STROMAL

## 2023-12-15 ASSESSMENT — REFRACTION_AUTOREFRACTION
OS_AXIS: 073
OD_SPHERE: +0.25
OS_CYLINDER: -1.00
OS_SPHERE: +0.25
OD_AXIS: 117
OD_CYLINDER: -0.75

## 2023-12-15 ASSESSMENT — SPHEQUIV_DERIVED
OS_SPHEQUIV: -0.25
OD_SPHEQUIV: -0.125

## 2023-12-18 ENCOUNTER — RX RENEWAL (OUTPATIENT)
Age: 78
End: 2023-12-18

## 2023-12-18 RX ORDER — OMEPRAZOLE 20 MG/1
20 CAPSULE, DELAYED RELEASE ORAL TWICE DAILY
Qty: 180 | Refills: 3 | Status: ACTIVE | COMMUNITY
Start: 1900-01-01 | End: 1900-01-01

## 2024-01-03 ENCOUNTER — OFFICE (OUTPATIENT)
Dept: URBAN - METROPOLITAN AREA CLINIC 94 | Facility: CLINIC | Age: 79
Setting detail: OPHTHALMOLOGY
End: 2024-01-03
Payer: MEDICARE

## 2024-01-03 DIAGNOSIS — H43.813: ICD-10-CM

## 2024-01-03 DIAGNOSIS — H33.42: ICD-10-CM

## 2024-01-03 DIAGNOSIS — H43.391: ICD-10-CM

## 2024-01-03 DIAGNOSIS — H35.373: ICD-10-CM

## 2024-01-03 PROCEDURE — 92134 CPTRZ OPH DX IMG PST SGM RTA: CPT | Performed by: OPHTHALMOLOGY

## 2024-01-03 PROCEDURE — 92012 INTRM OPH EXAM EST PATIENT: CPT | Performed by: OPHTHALMOLOGY

## 2024-01-03 ASSESSMENT — CONFRONTATIONAL VISUAL FIELD TEST (CVF)
OD_FINDINGS: FULL
OS_FINDINGS: FULL

## 2024-01-03 ASSESSMENT — REFRACTION_AUTOREFRACTION
OS_CYLINDER: -1.00
OD_CYLINDER: -0.75
OS_AXIS: 073
OD_SPHERE: +0.25
OS_SPHERE: +0.25
OD_AXIS: 117

## 2024-01-03 ASSESSMENT — SUPERFICIAL PUNCTATE KERATITIS (SPK)
OS_SPK: 1+
OD_SPK: 1+

## 2024-01-03 ASSESSMENT — CORNEAL SURGICAL SCARRING
OD_SCARRING: STROMAL
OS_SCARRING: STROMAL

## 2024-01-03 ASSESSMENT — SPHEQUIV_DERIVED
OS_SPHEQUIV: -0.25
OD_SPHEQUIV: -0.125

## 2024-03-05 ENCOUNTER — OFFICE (OUTPATIENT)
Dept: URBAN - METROPOLITAN AREA CLINIC 112 | Facility: CLINIC | Age: 79
Setting detail: OPHTHALMOLOGY
End: 2024-03-05
Payer: MEDICARE

## 2024-03-05 DIAGNOSIS — H53.9: ICD-10-CM

## 2024-03-05 DIAGNOSIS — H40.013: ICD-10-CM

## 2024-03-05 DIAGNOSIS — H17.9: ICD-10-CM

## 2024-03-05 DIAGNOSIS — H16.223: ICD-10-CM

## 2024-03-05 DIAGNOSIS — H43.391: ICD-10-CM

## 2024-03-05 PROBLEM — H52.4 PRESBYOPIA ; BOTH EYES: Status: ACTIVE | Noted: 2024-03-05

## 2024-03-05 PROBLEM — H01.004 BLEPHARITIS; RIGHT UPPER LID, RIGHT LOWER LID, LEFT UPPER LID, LEFT LOWER LID: Status: ACTIVE | Noted: 2024-03-05

## 2024-03-05 PROBLEM — H01.005 BLEPHARITIS; RIGHT UPPER LID, RIGHT LOWER LID, LEFT UPPER LID, LEFT LOWER LID: Status: ACTIVE | Noted: 2024-03-05

## 2024-03-05 PROBLEM — H01.001 BLEPHARITIS; RIGHT UPPER LID, RIGHT LOWER LID, LEFT UPPER LID, LEFT LOWER LID: Status: ACTIVE | Noted: 2024-03-05

## 2024-03-05 PROBLEM — H01.002 BLEPHARITIS; RIGHT UPPER LID, RIGHT LOWER LID, LEFT UPPER LID, LEFT LOWER LID: Status: ACTIVE | Noted: 2024-03-05

## 2024-03-05 PROCEDURE — 92250 FUNDUS PHOTOGRAPHY W/I&R: CPT | Performed by: OPHTHALMOLOGY

## 2024-03-05 PROCEDURE — 99213 OFFICE O/P EST LOW 20 MIN: CPT | Performed by: OPHTHALMOLOGY

## 2024-03-05 PROCEDURE — 92025 CPTRIZED CORNEAL TOPOGRAPHY: CPT | Performed by: OPHTHALMOLOGY

## 2024-03-05 ASSESSMENT — REFRACTION_MANIFEST
OD_VA1: 20/20
OD_SPHERE: +0.25
OS_AXIS: 065
OS_CYLINDER: -1.50
OD_AXIS: 115
OS_VA1: 20/40
OS_SPHERE: +0.50
OD_CYLINDER: -0.50

## 2024-03-05 ASSESSMENT — SPHEQUIV_DERIVED
OD_SPHEQUIV: 0
OS_SPHEQUIV: -0.25

## 2024-03-05 ASSESSMENT — LID EXAM ASSESSMENTS
OD_BLEPHARITIS: RLL RUL T
OS_BLEPHARITIS: LLL LUL T

## 2024-07-03 ENCOUNTER — OFFICE (OUTPATIENT)
Dept: URBAN - METROPOLITAN AREA CLINIC 94 | Facility: CLINIC | Age: 79
Setting detail: OPHTHALMOLOGY
End: 2024-07-03
Payer: MEDICARE

## 2024-07-03 DIAGNOSIS — H33.42: ICD-10-CM

## 2024-07-03 DIAGNOSIS — H43.813: ICD-10-CM

## 2024-07-03 PROCEDURE — 92134 CPTRZ OPH DX IMG PST SGM RTA: CPT | Performed by: OPHTHALMOLOGY

## 2024-07-03 PROCEDURE — 92012 INTRM OPH EXAM EST PATIENT: CPT | Performed by: OPHTHALMOLOGY

## 2024-07-03 ASSESSMENT — LID EXAM ASSESSMENTS
OD_BLEPHARITIS: RLL RUL T
OS_BLEPHARITIS: LLL LUL T

## 2024-07-03 ASSESSMENT — CONFRONTATIONAL VISUAL FIELD TEST (CVF)
OS_FINDINGS: FULL
OD_FINDINGS: FULL

## 2024-07-19 ENCOUNTER — ASC (OUTPATIENT)
Dept: URBAN - METROPOLITAN AREA SURGERY 8 | Facility: SURGERY | Age: 79
Setting detail: OPHTHALMOLOGY
End: 2024-07-19
Payer: MEDICARE

## 2024-07-19 DIAGNOSIS — H43.811: ICD-10-CM

## 2024-07-19 PROCEDURE — 67036 REMOVAL OF INNER EYE FLUID: CPT | Mod: RT | Performed by: OPHTHALMOLOGY

## 2024-07-23 ENCOUNTER — RX ONLY (RX ONLY)
Age: 79
End: 2024-07-23

## 2024-07-23 ENCOUNTER — OFFICE (OUTPATIENT)
Dept: URBAN - METROPOLITAN AREA CLINIC 94 | Facility: CLINIC | Age: 79
Setting detail: OPHTHALMOLOGY
End: 2024-07-23
Payer: MEDICARE

## 2024-07-23 DIAGNOSIS — H35.373: ICD-10-CM

## 2024-07-23 DIAGNOSIS — H33.42: ICD-10-CM

## 2024-07-23 DIAGNOSIS — H43.391: ICD-10-CM

## 2024-07-23 DIAGNOSIS — H43.813: ICD-10-CM

## 2024-07-23 PROCEDURE — 99024 POSTOP FOLLOW-UP VISIT: CPT | Performed by: OPHTHALMOLOGY

## 2024-07-23 ASSESSMENT — CONFRONTATIONAL VISUAL FIELD TEST (CVF)
OD_FINDINGS: FULL
OS_FINDINGS: FULL

## 2024-07-23 ASSESSMENT — LID EXAM ASSESSMENTS
OS_BLEPHARITIS: LLL LUL T
OD_BLEPHARITIS: RLL RUL T

## 2024-08-13 ENCOUNTER — OFFICE (OUTPATIENT)
Dept: URBAN - METROPOLITAN AREA CLINIC 94 | Facility: CLINIC | Age: 79
Setting detail: OPHTHALMOLOGY
End: 2024-08-13
Payer: MEDICARE

## 2024-08-13 DIAGNOSIS — H43.811: ICD-10-CM

## 2024-08-13 PROBLEM — H43.812 POSTERIOR VITREOUS DETACHMENT; RIGHT EYE, LEFT EYE, BOTH EYES: Status: ACTIVE | Noted: 2024-08-13

## 2024-08-13 PROBLEM — H43.813 POSTERIOR VITREOUS DETACHMENT; RIGHT EYE, LEFT EYE, BOTH EYES: Status: ACTIVE | Noted: 2024-08-13

## 2024-08-13 PROCEDURE — 99024 POSTOP FOLLOW-UP VISIT: CPT | Performed by: OPHTHALMOLOGY

## 2024-09-17 ENCOUNTER — OFFICE (OUTPATIENT)
Dept: URBAN - METROPOLITAN AREA CLINIC 112 | Facility: CLINIC | Age: 79
Setting detail: OPHTHALMOLOGY
End: 2024-09-17
Payer: MEDICARE

## 2024-09-17 DIAGNOSIS — H43.813: ICD-10-CM

## 2024-09-17 DIAGNOSIS — H16.223: ICD-10-CM

## 2024-09-17 DIAGNOSIS — H43.811: ICD-10-CM

## 2024-09-17 DIAGNOSIS — H43.812: ICD-10-CM

## 2024-09-17 DIAGNOSIS — H40.013: ICD-10-CM

## 2024-09-17 DIAGNOSIS — Z96.1: ICD-10-CM

## 2024-09-17 PROCEDURE — 92083 EXTENDED VISUAL FIELD XM: CPT | Performed by: OPHTHALMOLOGY

## 2024-09-17 PROCEDURE — 92014 COMPRE OPH EXAM EST PT 1/>: CPT | Performed by: OPHTHALMOLOGY

## 2024-09-17 PROCEDURE — 92133 CPTRZD OPH DX IMG PST SGM ON: CPT | Performed by: OPHTHALMOLOGY

## 2024-09-17 ASSESSMENT — LID EXAM ASSESSMENTS
OS_BLEPHARITIS: LLL LUL T
OD_BLEPHARITIS: RLL RUL T

## 2024-09-17 ASSESSMENT — CONFRONTATIONAL VISUAL FIELD TEST (CVF)
OS_FINDINGS: FULL
OD_FINDINGS: FULL

## 2024-12-10 ENCOUNTER — OFFICE (OUTPATIENT)
Dept: URBAN - METROPOLITAN AREA CLINIC 94 | Facility: CLINIC | Age: 79
Setting detail: OPHTHALMOLOGY
End: 2024-12-10
Payer: MEDICARE

## 2024-12-10 DIAGNOSIS — H35.373: ICD-10-CM

## 2024-12-10 DIAGNOSIS — H33.42: ICD-10-CM

## 2024-12-10 DIAGNOSIS — H43.391: ICD-10-CM

## 2024-12-10 DIAGNOSIS — H43.813: ICD-10-CM

## 2024-12-10 PROCEDURE — 99213 OFFICE O/P EST LOW 20 MIN: CPT | Performed by: OPHTHALMOLOGY

## 2024-12-10 PROCEDURE — 92134 CPTRZ OPH DX IMG PST SGM RTA: CPT | Performed by: OPHTHALMOLOGY

## 2024-12-10 ASSESSMENT — KERATOMETRY
METHOD_AUTO_MANUAL: AUTO
OS_K2POWER_DIOPTERS: 43.00
OS_AXISANGLE_DEGREES: 154
OD_K1POWER_DIOPTERS: 42.75
OD_K2POWER_DIOPTERS: 43.00
OD_AXISANGLE_DEGREES: 076
OS_K1POWER_DIOPTERS: 42.25

## 2024-12-10 ASSESSMENT — REFRACTION_AUTOREFRACTION
OD_SPHERE: 0.00
OS_SPHERE: +0.50
OS_CYLINDER: -1.00
OD_CYLINDER: -0.50
OS_AXIS: 063
OD_AXIS: 119

## 2024-12-10 ASSESSMENT — CONFRONTATIONAL VISUAL FIELD TEST (CVF)
OS_FINDINGS: FULL
OD_FINDINGS: FULL

## 2024-12-10 ASSESSMENT — CORNEAL SURGICAL SCARRING
OS_SCARRING: STROMAL
OD_SCARRING: STROMAL

## 2024-12-10 ASSESSMENT — LID EXAM ASSESSMENTS
OS_BLEPHARITIS: LLL LUL T
OD_BLEPHARITIS: RLL RUL T

## 2024-12-10 ASSESSMENT — TONOMETRY: OS_IOP_MMHG: 13

## 2024-12-10 ASSESSMENT — VISUAL ACUITY
OS_BCVA: 20/20
OD_BCVA: 20/30-

## 2024-12-10 ASSESSMENT — PACHYMETRY
OD_CT_CORRECTION: -2
OD_CT_UM: 579
OS_CT_UM: 588
OS_CT_CORRECTION: -3

## 2024-12-10 ASSESSMENT — SUPERFICIAL PUNCTATE KERATITIS (SPK)
OD_SPK: 1+
OS_SPK: 1+

## 2025-03-27 ENCOUNTER — OFFICE (OUTPATIENT)
Dept: URBAN - METROPOLITAN AREA CLINIC 112 | Facility: CLINIC | Age: 80
Setting detail: OPHTHALMOLOGY
End: 2025-03-27
Payer: MEDICARE

## 2025-03-27 DIAGNOSIS — H01.001: ICD-10-CM

## 2025-03-27 DIAGNOSIS — H01.005: ICD-10-CM

## 2025-03-27 DIAGNOSIS — Z96.1: ICD-10-CM

## 2025-03-27 DIAGNOSIS — H35.033: ICD-10-CM

## 2025-03-27 DIAGNOSIS — H40.013: ICD-10-CM

## 2025-03-27 DIAGNOSIS — H01.002: ICD-10-CM

## 2025-03-27 DIAGNOSIS — H16.223: ICD-10-CM

## 2025-03-27 DIAGNOSIS — H01.004: ICD-10-CM

## 2025-03-27 DIAGNOSIS — H17.9: ICD-10-CM

## 2025-03-27 PROCEDURE — 92014 COMPRE OPH EXAM EST PT 1/>: CPT | Performed by: OPHTHALMOLOGY

## 2025-03-27 PROCEDURE — 92250 FUNDUS PHOTOGRAPHY W/I&R: CPT | Performed by: OPHTHALMOLOGY

## 2025-03-27 ASSESSMENT — CORNEAL SURGICAL SCARRING
OS_SCARRING: STROMAL
OD_SCARRING: STROMAL

## 2025-03-27 ASSESSMENT — KERATOMETRY
OS_K1POWER_DIOPTERS: 42.00
METHOD_AUTO_MANUAL: AUTO
OD_K1POWER_DIOPTERS: 42.50
OS_K2POWER_DIOPTERS: 42.75
OD_AXISANGLE_DEGREES: 078
OD_K2POWER_DIOPTERS: 43.00
OS_AXISANGLE_DEGREES: 164

## 2025-03-27 ASSESSMENT — REFRACTION_AUTOREFRACTION
OD_CYLINDER: -0.50
OS_CYLINDER: -1.25
OS_SPHERE: +0.75
OD_AXIS: 116
OD_SPHERE: 0.00
OS_AXIS: 067

## 2025-03-27 ASSESSMENT — SUPERFICIAL PUNCTATE KERATITIS (SPK)
OS_SPK: 1+
OD_SPK: 1+

## 2025-03-27 ASSESSMENT — LID EXAM ASSESSMENTS
OS_BLEPHARITIS: LLL LUL T
OD_BLEPHARITIS: RLL RUL T

## 2025-03-27 ASSESSMENT — TONOMETRY
OD_IOP_MMHG: 13
OS_IOP_MMHG: 12

## 2025-03-27 ASSESSMENT — PACHYMETRY
OS_CT_CORRECTION: -3
OS_CT_UM: 588
OD_CT_CORRECTION: -2
OD_CT_UM: 579

## 2025-03-27 ASSESSMENT — VISUAL ACUITY
OD_BCVA: 20/50
OS_BCVA: 20/20

## 2025-03-27 ASSESSMENT — CONFRONTATIONAL VISUAL FIELD TEST (CVF)
OS_FINDINGS: FULL
OD_FINDINGS: FULL

## 2025-04-08 ENCOUNTER — APPOINTMENT (OUTPATIENT)
Dept: GASTROENTEROLOGY | Facility: CLINIC | Age: 80
End: 2025-04-08
Payer: MEDICARE

## 2025-04-08 VITALS
WEIGHT: 185 LBS | DIASTOLIC BLOOD PRESSURE: 70 MMHG | HEIGHT: 71 IN | SYSTOLIC BLOOD PRESSURE: 130 MMHG | BODY MASS INDEX: 25.9 KG/M2

## 2025-04-08 DIAGNOSIS — Z76.0 ENCOUNTER FOR ISSUE OF REPEAT PRESCRIPTION: ICD-10-CM

## 2025-04-08 DIAGNOSIS — K21.9 GASTRO-ESOPHAGEAL REFLUX DISEASE W/OUT ESOPHAGITIS: ICD-10-CM

## 2025-04-08 PROCEDURE — 99203 OFFICE O/P NEW LOW 30 MIN: CPT

## 2025-04-08 PROCEDURE — G2211 COMPLEX E/M VISIT ADD ON: CPT

## 2025-07-07 ENCOUNTER — RX RENEWAL (OUTPATIENT)
Age: 80
End: 2025-07-07